# Patient Record
Sex: FEMALE | Race: WHITE
[De-identification: names, ages, dates, MRNs, and addresses within clinical notes are randomized per-mention and may not be internally consistent; named-entity substitution may affect disease eponyms.]

---

## 2017-08-22 ENCOUNTER — HOSPITAL ENCOUNTER (EMERGENCY)
Dept: HOSPITAL 80 - FED | Age: 57
Discharge: HOME | End: 2017-08-22
Payer: COMMERCIAL

## 2017-08-22 VITALS
TEMPERATURE: 97.9 F | SYSTOLIC BLOOD PRESSURE: 152 MMHG | HEART RATE: 81 BPM | OXYGEN SATURATION: 96 % | DIASTOLIC BLOOD PRESSURE: 102 MMHG

## 2017-08-22 VITALS — RESPIRATION RATE: 16 BRPM

## 2017-08-22 DIAGNOSIS — E86.9: ICD-10-CM

## 2017-08-22 DIAGNOSIS — I10: ICD-10-CM

## 2017-08-22 DIAGNOSIS — L03.116: Primary | ICD-10-CM

## 2017-08-22 NOTE — EDPHY
H & P


Stated Complaint: L shin lac -swelling--fell in wood slash pile 3 days ago


HPI/ROS: 





Chief complaint:  Left shin laceration, possible leg infection





History of present illness:  This is a 56-year-old female who presents to the 

emergency department for evaluation of a left shin laceration that she is 

concerned has gotten infected.  Patient states 2 days ago she tripped and fell 

striking her left shin against a pile of wood cutting it.  She attempted to 

clean it and dress it.  However has become painful.  She has noted discharge. 

The leg is become uncomfortable slightly swollen and red.  She denies other 

associated signs or symptoms including no fevers, no red streaking up the leg, 

no pain in the back of the leg, no abnormal coolness or paresthesias in the leg

, no other lesions. Her tetanus is up-to-date.





Review of systems:  10 point review of systems was obtained and other than 

described above was negative





- Personal History


Current Tetanus/Diphtheria Vaccine: Yes


Current Tetanus Diphtheria and Acellular Pertussis (TDAP): Yes


Tetanus Vaccine Date: 2017





- Medical/Surgical History


Hx Asthma: No


Hx Chronic Respiratory Disease: No


Hx Diabetes: No


Hx Cardiac Disease: No


Hx Renal Disease: No


Hx Cirrhosis: No


Hx Alcoholism: No


Hx HIV/AIDS: No


Hx Splenectomy or Spleen Trauma: No


Other PMH: htn





- Social History


Smoking Status: Never smoked





- Physical Exam


Exam: 





General:  Alert, nontoxic


Skin:  3 cm avulsion to the left shin.  There is pustular discharge. There is 

surrounding erythema and edema to the anterior shin down to the ankle.  The 

posterior aspect of the leg is unaffected.  There is no red streaking up the 

leg.


Musculoskeletal:  Patient is moving the left leg, all joints all fields without 

difficulty.  She is ambulating well.


Vascular:  DP and PT pulses 2+.


Neurologic:  Sensation intact throughout the left leg.


Constitutional: 


 Initial Vital Signs











Temperature (C)  36.9 C   08/22/17 19:14


 


Heart Rate  112 H  08/22/17 19:14


 


Respiratory Rate  18   08/22/17 19:14


 


Blood Pressure  153/110 H  08/22/17 19:14


 


O2 Sat (%)  98   08/22/17 19:14








 











O2 Delivery Mode               Room Air














Allergies/Adverse Reactions: 


 





No Known Allergies Allergy (Unverified 08/22/17 21:40)


 








Home Medications: 














 Medication  Instructions  Recorded


 


Amoxicillin/Clavulanate Pot 875 mg PO BID 10 Days 08/22/17





[Augmentin 875 MG TAB (*)]  


 


Sulfamethox/Tmp 800/160 mg 1 tab PO BID 10 Days 08/22/17





[Bactrim Ds]  














Medical Decision Making





- Diagnostics


Imaging Results: 


 Imaging Impressions





Tibia/Fibula X-Ray  08/22/17 20:40


Impression: Negative for fracture.











Imaging: I viewed and interpreted images myself


Procedures: 





The wound was anesthetized with 1% lidocaine with epinephrine.  It was 

irrigated.  It was explored, no foreign bodies noted.  It was dressed.


ED Course/Re-evaluation: 





Patient is discussed with my secondary supervising physician Dr. Laughlin McCollester.  Patient presents to the emergency department with what appears to 

be a leg wound with associated cellulitis.  She is nontoxic.  Her leg is 

neurovascularly intact.  X-ray is negative. The wound is cleaned and dressed, 

is not closed given length of time since injury and infection.  She is started 

on Unasyn given that this was secondary to a cut from a pile of wood although 

she is also started on Bactrim in case there is a MRSA component.  She will be 

discharged home on Augmentin and Bactrim for full coverage.  She is asked to 

follow up with primary care doctor for recheck.  Return precautions are given.  

Patient voiced understanding and agreement with plan.


Differential Diagnosis: 





Included but not limited to cellulitis, abscess, this is not consistent with 

thromboembolic disease.





- Data Points


Medications Given: 


 








Discontinued Medications





Ampicillin Sodium/Sulbactam (Sodium 3 gm/ Sodium Chloride)  100 mls @ 200 mls/

hr IV EDNOW ONE


   PRN Reason: Protocol


   Stop: 08/22/17 20:23


   Last Admin: 08/22/17 20:16 Dose:  100 mls


Sodium Chloride (Ns)  1,000 mls @ 0 mls/hr IV EDNOW ONE; Wide Open


   PRN Reason: Protocol


   Stop: 08/22/17 19:56


   Last Admin: 08/22/17 20:15 Dose:  1,000 mls


Trimethoprim/Sulfamethoxazole (Bactrim Ds)  1 ea PO EDNOW ONE


   PRN Reason: Protocol


   Stop: 08/22/17 19:55


   Last Admin: 08/22/17 21:58 Dose:  1 ea








Departure





- Departure


Disposition: Home, Routine, Self-Care


Clinical Impression: 


 Cellulitis





Condition: Good


Instructions:  Cellulitis (ED)


Additional Instructions: 


Follow-up with your primary care doctor in the next 1-2 days for recheck





Take antibiotics as prescribed until finished even feeling better





If symptoms worsen or new symptoms develop return to the emergency room for 

recheck


Referrals: 


French Bryan MD [Primary Care Provider] - As per Instructions


Prescriptions: 


Amoxicillin/Clavulanate Pot [Augmentin 875 MG TAB (*)] 875 mg PO BID 10 Days


Sulfamethox/Tmp 800/160 mg [Bactrim Ds] 1 tab PO BID 10 Days

## 2018-03-23 ENCOUNTER — HOSPITAL ENCOUNTER (OUTPATIENT)
Dept: HOSPITAL 80 - FIMAGING | Age: 58
End: 2018-03-23
Payer: COMMERCIAL

## 2018-03-23 DIAGNOSIS — Z12.31: Primary | ICD-10-CM

## 2018-03-23 DIAGNOSIS — Z80.3: ICD-10-CM

## 2018-03-29 ENCOUNTER — HOSPITAL ENCOUNTER (OUTPATIENT)
Dept: HOSPITAL 80 - FIMAGING | Age: 58
End: 2018-03-29
Payer: COMMERCIAL

## 2018-03-29 DIAGNOSIS — R16.0: ICD-10-CM

## 2018-03-29 DIAGNOSIS — K76.0: Primary | ICD-10-CM

## 2018-03-29 DIAGNOSIS — K76.89: ICD-10-CM

## 2018-03-29 PROCEDURE — A9585 GADOBUTROL INJECTION: HCPCS

## 2018-06-14 ENCOUNTER — HOSPITAL ENCOUNTER (EMERGENCY)
Dept: HOSPITAL 80 - FED | Age: 58
Discharge: HOME | End: 2018-06-14
Payer: COMMERCIAL

## 2018-06-14 VITALS — DIASTOLIC BLOOD PRESSURE: 98 MMHG | SYSTOLIC BLOOD PRESSURE: 122 MMHG

## 2018-06-14 DIAGNOSIS — I10: ICD-10-CM
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
## 2018-06-14 DIAGNOSIS — Z04.1: Primary | ICD-10-CM
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
## 2019-02-22 ENCOUNTER — HOSPITAL ENCOUNTER (OUTPATIENT)
Dept: HOSPITAL 80 - FIMAGING | Age: 59
Discharge: HOME | End: 2019-02-22
Payer: COMMERCIAL

## 2019-02-22 DIAGNOSIS — M46.1: Primary | ICD-10-CM
